# Patient Record
Sex: MALE
[De-identification: names, ages, dates, MRNs, and addresses within clinical notes are randomized per-mention and may not be internally consistent; named-entity substitution may affect disease eponyms.]

---

## 2023-01-25 PROBLEM — Z00.00 ENCOUNTER FOR PREVENTIVE HEALTH EXAMINATION: Status: ACTIVE | Noted: 2023-01-25

## 2023-01-30 ENCOUNTER — APPOINTMENT (OUTPATIENT)
Dept: COLORECTAL SURGERY | Facility: CLINIC | Age: 33
End: 2023-01-30
Payer: COMMERCIAL

## 2023-01-30 ENCOUNTER — TRANSCRIPTION ENCOUNTER (OUTPATIENT)
Age: 33
End: 2023-01-30

## 2023-01-30 VITALS
SYSTOLIC BLOOD PRESSURE: 124 MMHG | HEIGHT: 71 IN | BODY MASS INDEX: 21.7 KG/M2 | TEMPERATURE: 97.4 F | HEART RATE: 72 BPM | DIASTOLIC BLOOD PRESSURE: 85 MMHG | OXYGEN SATURATION: 100 % | WEIGHT: 155 LBS | RESPIRATION RATE: 16 BRPM

## 2023-01-30 DIAGNOSIS — Z11.51 ENCOUNTER FOR SCREENING FOR HUMAN PAPILLOMAVIRUS (HPV): ICD-10-CM

## 2023-01-30 DIAGNOSIS — K64.8 OTHER HEMORRHOIDS: ICD-10-CM

## 2023-01-30 PROCEDURE — 46600 DIAGNOSTIC ANOSCOPY SPX: CPT

## 2023-01-30 PROCEDURE — 99203 OFFICE O/P NEW LOW 30 MIN: CPT | Mod: 25

## 2023-01-30 NOTE — ASSESSMENT
[FreeTextEntry1] : Exam findings and diagnosis were discussed at length with patient.\par \par I have reviewed with the patient the clinical and natural history of human papilloma virus and its relationship to the anus. The risks and associated consequences including sexual transmission, anal warts, anal dysplasia and risk of anal cancer have been outlined. The role for long term surveillance and follow up have been detailed. The treatment options, including office anoscopy, excision and fulguration of anal lesions and high resolution anoscopy were discussed. The patient wishes to proceed with surveillance and management of identified visible/palpable lesions if identified.\par \par Anoscopy and anal pap smear performed today\par Continue surveillance.\par Further recommendations pending review of cytology\par \par Hemorrhoid and anal skin tag management discussed.\par Continue supportive care.\par Avoid diarrhea/constipation.\par Recommend Calmol-4 suppositories as needed.\par F/u evaluation if symptoms return.\par \par All questions were answered, patient expressed understanding, and is agreeable to this plan.\par

## 2023-01-30 NOTE — PHYSICAL EXAM
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 \par \par Anorectal Exam:\par Inspection no erythema, induration or fluctuance, no skin excoriation, no fissure, small anal skin tag posterior midline anal verge without overlying skin changes, no external hemorrhoidal inflammation or thrombosis. \par DARY nontender, no masses palpated, no blood on gloved finger\par \par Procedure: Anoscopy\par \par Pre procedure Diagnosis: HPV screening, hemorrhoids, anal skin tag\par Post procedure Diagnosis: HPV screening, hemorrhoids, anal skin tag\par Anesthesia: none\par Estimated blood loss: none\par Specimen: none\par Complications: none\par \par Consent obtained. Anoscopy was performed by passing a lighted anoscope with lubricant jelly into the anal canal and the entire anal mucosal surface was inspected. Findings included no fissure, mild internal hemorrhoids with inflammation, no visible masses or lesions in anal canal\par \par Patient tolerated examination and procedure well.\par \par \par

## 2023-01-30 NOTE — HISTORY OF PRESENT ILLNESS
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 \par \par He completed Gardasil vaccine about 10 years\par \par HIV negative. 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 \par H/o molluscum on neck.\par H/o epidermoid cyst on scrotum (recent biopsy)\par \par 10 years ago he had an anoscopy for hemorrhoid - was having BRB in bowl and TP at that time. Also has external hemorrhoid.\par Used OTC hemorrhoids medications in past when he had symptoms.\par H/o constipation and anally receptive intercourse at that time.\par \par External hemorrhoid still present.\par Denies pain, itching or bleeding for several months now. \par \par Daily BM\par Coffee assists with BM, otherwise denies laxative or stool softeners.\par

## 2023-02-01 LAB — ANAL PAP CYTOLOGY: NORMAL
